# Patient Record
Sex: MALE | Race: WHITE | NOT HISPANIC OR LATINO | Employment: FULL TIME | URBAN - METROPOLITAN AREA
[De-identification: names, ages, dates, MRNs, and addresses within clinical notes are randomized per-mention and may not be internally consistent; named-entity substitution may affect disease eponyms.]

---

## 2017-02-01 ENCOUNTER — HOSPITAL ENCOUNTER (OUTPATIENT)
Dept: CARDIOLOGY | Facility: HOSPITAL | Age: 52
Discharge: HOME | End: 2017-02-01
Attending: INTERNAL MEDICINE | Admitting: INTERNAL MEDICINE

## 2017-02-01 VITALS
HEIGHT: 68 IN | DIASTOLIC BLOOD PRESSURE: 69 MMHG | WEIGHT: 150 LBS | BODY MASS INDEX: 22.73 KG/M2 | SYSTOLIC BLOOD PRESSURE: 122 MMHG

## 2017-02-01 DIAGNOSIS — I25.10 DISEASE OF CARDIOVASCULAR SYSTEM: ICD-10-CM

## 2017-02-01 PROCEDURE — 93306 TTE W/DOPPLER COMPLETE: CPT | Performed by: INTERNAL MEDICINE

## 2017-02-01 PROCEDURE — 93306 TTE W/DOPPLER COMPLETE: CPT

## 2017-02-02 LAB
BH CV ECHO MEAS - AO ROOT AREA (BSA CORRECTED): 1.8
BH CV ECHO MEAS - AO ROOT AREA: 8.6 CM^2
BH CV ECHO MEAS - AO ROOT DIAM: 3.3 CM
BH CV ECHO MEAS - BSA(HAYCOCK): 1.8 M^2
BH CV ECHO MEAS - BSA: 1.8 M^2
BH CV ECHO MEAS - BZI_BMI: 22.8 KILOGRAMS/M^2
BH CV ECHO MEAS - BZI_METRIC_HEIGHT: 172.7 CM
BH CV ECHO MEAS - BZI_METRIC_WEIGHT: 68 KG
BH CV ECHO MEAS - CONTRAST EF (2CH): 49 ML/M^2
BH CV ECHO MEAS - CONTRAST EF 4CH: 39.8 ML/M^2
BH CV ECHO MEAS - EDV(CUBED): 238.3 ML
BH CV ECHO MEAS - EDV(MOD-SP2): 100 ML
BH CV ECHO MEAS - EDV(MOD-SP4): 93 ML
BH CV ECHO MEAS - EDV(TEICH): 194 ML
BH CV ECHO MEAS - EF(CUBED): 53.6 %
BH CV ECHO MEAS - EF(MOD-SP2): 49 %
BH CV ECHO MEAS - EF(MOD-SP4): 39.8 %
BH CV ECHO MEAS - EF(TEICH): 44.6 %
BH CV ECHO MEAS - ESV(CUBED): 110.6 ML
BH CV ECHO MEAS - ESV(MOD-SP2): 51 ML
BH CV ECHO MEAS - ESV(MOD-SP4): 56 ML
BH CV ECHO MEAS - ESV(TEICH): 107.5 ML
BH CV ECHO MEAS - FS: 22.6 %
BH CV ECHO MEAS - IVS/LVPW: 0.75
BH CV ECHO MEAS - IVSD: 0.6 CM
BH CV ECHO MEAS - LA DIMENSION: 3.4 CM
BH CV ECHO MEAS - LA/AO: 1
BH CV ECHO MEAS - LAT PEAK E' VEL: 10 CM/SEC
BH CV ECHO MEAS - LV DIASTOLIC VOL/BSA (35-75): 51.4 ML/M^2
BH CV ECHO MEAS - LV MASS(C)D: 167.5 GRAMS
BH CV ECHO MEAS - LV MASS(C)DI: 92.6 GRAMS/M^2
BH CV ECHO MEAS - LV SYSTOLIC VOL/BSA (12-30): 31 ML/M^2
BH CV ECHO MEAS - LVIDD: 6.2 CM
BH CV ECHO MEAS - LVIDS: 4.8 CM
BH CV ECHO MEAS - LVLD AP2: 7.6 CM
BH CV ECHO MEAS - LVLD AP4: 6.6 CM
BH CV ECHO MEAS - LVLS AP2: 6.9 CM
BH CV ECHO MEAS - LVLS AP4: 6.1 CM
BH CV ECHO MEAS - LVPWD: 0.8 CM
BH CV ECHO MEAS - MED PEAK E' VEL: 8.08 CM/SEC
BH CV ECHO MEAS - MV A MAX VEL: 48.9 CM/SEC
BH CV ECHO MEAS - MV E MAX VEL: 72.1 CM/SEC
BH CV ECHO MEAS - MV E/A: 1.5
BH CV ECHO MEAS - PA ACC SLOPE: 325 CM/SEC^2
BH CV ECHO MEAS - PA ACC TIME: 0.16 SEC
BH CV ECHO MEAS - PA PR(ACCEL): 9.3 MMHG
BH CV ECHO MEAS - PI END-D VEL: 93.7 CM/SEC
BH CV ECHO MEAS - RAP SYSTOLE: 3 MMHG
BH CV ECHO MEAS - RVDD: 2.6 CM
BH CV ECHO MEAS - RVSP: 32.2 MMHG
BH CV ECHO MEAS - SI(CUBED): 70.6 ML/M^2
BH CV ECHO MEAS - SI(MOD-SP2): 27.1 ML/M^2
BH CV ECHO MEAS - SI(MOD-SP4): 20.5 ML/M^2
BH CV ECHO MEAS - SI(TEICH): 47.8 ML/M^2
BH CV ECHO MEAS - SV(CUBED): 127.7 ML
BH CV ECHO MEAS - SV(MOD-SP2): 49 ML
BH CV ECHO MEAS - SV(MOD-SP4): 37 ML
BH CV ECHO MEAS - SV(TEICH): 86.5 ML
BH CV ECHO MEAS - TAPSE (>1.6): 2.8 CM2
BH CV ECHO MEAS - TR MAX VEL: 270 CM/SEC
BH CV XLRA - RV BASE: 4.6 CM
BH CV XLRA - RV LENGTH: 8.1 CM
BH CV XLRA - RV MID: 3.8 CM
BH CV XLRA - TDI S': 14.1 CM/SEC
LEFT ATRIUM VOLUME INDEX: 23.8 ML/M2
LEFT ATRIUM VOLUME: 43 CM3
LV EF 2D ECHO EST: 40 %

## 2017-02-21 ENCOUNTER — OFFICE VISIT (OUTPATIENT)
Dept: CARDIOLOGY | Facility: CLINIC | Age: 52
End: 2017-02-21

## 2017-02-21 VITALS
WEIGHT: 153.8 LBS | BODY MASS INDEX: 23.31 KG/M2 | HEIGHT: 68 IN | DIASTOLIC BLOOD PRESSURE: 62 MMHG | HEART RATE: 83 BPM | SYSTOLIC BLOOD PRESSURE: 100 MMHG

## 2017-02-21 DIAGNOSIS — I25.10 CORONARY ARTERY DISEASE INVOLVING NATIVE CORONARY ARTERY OF NATIVE HEART WITHOUT ANGINA PECTORIS: Primary | ICD-10-CM

## 2017-02-21 DIAGNOSIS — I25.5 ISCHEMIC CARDIOMYOPATHY: ICD-10-CM

## 2017-02-21 DIAGNOSIS — Z72.0 TOBACCO ABUSE: ICD-10-CM

## 2017-02-21 DIAGNOSIS — I49.3 PVC'S (PREMATURE VENTRICULAR CONTRACTIONS): ICD-10-CM

## 2017-02-21 DIAGNOSIS — E78.5 DYSLIPIDEMIA: ICD-10-CM

## 2017-02-21 PROCEDURE — 93000 ELECTROCARDIOGRAM COMPLETE: CPT | Performed by: NURSE PRACTITIONER

## 2017-02-21 PROCEDURE — 99213 OFFICE O/P EST LOW 20 MIN: CPT | Performed by: NURSE PRACTITIONER

## 2017-02-21 RX ORDER — ATORVASTATIN CALCIUM 40 MG/1
TABLET, FILM COATED ORAL DAILY
Refills: 2 | COMMUNITY
Start: 2017-02-18 | End: 2017-03-08 | Stop reason: DRUGHIGH

## 2017-02-21 RX ORDER — TICAGRELOR 90 MG/1
TABLET ORAL 2 TIMES DAILY
Refills: 2 | COMMUNITY
Start: 2017-02-18 | End: 2017-02-21 | Stop reason: DRUGHIGH

## 2017-02-21 RX ORDER — NITROGLYCERIN 0.4 MG/1
0.4 TABLET SUBLINGUAL AS NEEDED
COMMUNITY

## 2017-02-21 NOTE — PROGRESS NOTES
Subjective:     Encounter Date:02/21/2017      Patient ID: Westley Thomas is a 51 y.o. male.    Chief Complaint:Follow-up   for coronary artery disease, and ischemic cardio myopathy.    PROBLEM LIST:  1. Coronary artery disease.   a. Non-ST myocardial infarction, 03/23/2016. Cardiac catheterization: 95% mid-LAD (3.5 x 32 and 3.25 x 32 XIENCE). 100% diagonal. 50% right coronary artery.   b. Echocardiogram: Left ventricular ejection fraction 40% to 45%.   2. Ischemic cardiomyopathy  a. 2016 EF 40-45%  b. 2017 EF 40%  3. PVC/PAC's  4. Tobacco abuse.   a. Resolved, 02/2016.   5. History of encephalitis in 1993.   6. Tonsillectomy.       No Known Allergies      Current Outpatient Prescriptions:   •  aspirin 81 MG tablet, Take 81 mg by mouth Daily., Disp: , Rfl:   •  atorvastatin (LIPITOR) 40 MG tablet, Daily., Disp: , Rfl: 2  •  BRILINTA 90 MG tablet tablet, 2 (Two) Times a Day., Disp: , Rfl: 2  •  nitroglycerin (NITROSTAT) 0.4 MG SL tablet, Place 0.4 mg under the tongue As Needed for chest pain. Take no more than 3 doses in 15 minutes., Disp: , Rfl:         History of Present Illness    Patient is a 48-year-old  male who is seen today for follow-up of coronary disease and ischemic cardiomyopathy.  Since last being seen he notes overall doing well.  Has some occasional chest pain but this is infrequent and not similar to all to his previous angina.  Denies any increasing shortness of breath.  Notes that he is very active with his job and does lots of walking without any cardiovascular limiting symptoms.  Denies any syncope, near-syncope, edema.  He has an occasional cigarette but overall has stopped smoking.  Patient denies any palpitations but notes that during his echocardiogram he noted frequent irregular heartbeats on the monitor.    The following portions of the patient's history were reviewed and updated as appropriate: allergies, current medications, past family history, past medical history, past social  "history, past surgical history and problem list.    Family History   Problem Relation Age of Onset   • Cancer Father    • No Known Problems Mother        Social History   Substance Use Topics   • Smoking status: Former Smoker     Quit date: 08/2016   • Smokeless tobacco: Never Used   • Alcohol use No         Review of Systems   Constitution: Negative for fever, weakness and malaise/fatigue.   HENT: Negative for headaches and nosebleeds.    Eyes: Negative for redness and visual disturbance.   Cardiovascular: Negative for orthopnea, palpitations and paroxysmal nocturnal dyspnea.   Respiratory: Negative for cough, snoring, sputum production and wheezing.    Hematologic/Lymphatic: Negative for bleeding problem.   Skin: Negative for flushing, itching and rash.   Musculoskeletal: Negative for falls, joint pain and muscle cramps.   Gastrointestinal: Negative for abdominal pain, diarrhea, heartburn, nausea and vomiting.   Genitourinary: Negative for hematuria.   Neurological: Negative for excessive daytime sleepiness, dizziness and tremors.   Psychiatric/Behavioral: Negative for substance abuse. The patient is not nervous/anxious.           Objective:    height is 68\" (172.7 cm) and weight is 153 lb 12.8 oz (69.8 kg). His blood pressure is 100/62 and his pulse is 83.         Physical Exam   Constitutional: He is oriented to person, place, and time. He appears well-developed and well-nourished. No distress.   HENT:   Mouth/Throat: Oropharynx is clear and moist.   Neck: No JVD present. Carotid bruit is not present.   Cardiovascular: Normal rate, S1 normal, S2 normal, normal heart sounds and intact distal pulses.  An irregular rhythm present.   Pulmonary/Chest: Breath sounds normal.   Abdominal: Soft. Bowel sounds are normal. There is no tenderness.   Musculoskeletal: He exhibits no edema.   Neurological: He is alert and oriented to person, place, and time.   Skin: Skin is warm and dry.         ECG 12 Lead  Date/Time: " 2/21/2017 3:45 PM  Performed by: SAVANNAH NGUYEN  Authorized by: SAVANNAH NGUYEN   Comparison: compared with previous ECG from 3/3/2016  Rhythm: sinus rhythm  Ectopy: atrial premature contractions  Clinical impression: abnormal ECG                  Assessment:   Assessment/Plan      Westley was seen today for follow-up.    Diagnoses and all orders for this visit:    Coronary artery disease involving native coronary artery of native heart without angina pectoris  -     Comprehensive Metabolic Panel; Future    Ischemic cardiomyopathy, stable  -     Comprehensive Metabolic Panel; Future    Dyslipidemia, on statin therapy  -     Lipid Panel; Future    PVC's (premature ventricular contractions), asymptomatic but frequent  -     Comprehensive Metabolic Panel; Future  -     TSH; Future  -     CBC (No Diff); Future  -     Magnesium; Future    Tobacco abuse      Plan:    At this time we'll make no changes to the patient's medication regimen.  Overall he is stable from a cardiac standpoint.  Discussed his current medication regimen with him and need for continuation of his medications.  We'll continue him on Brilinta therapy at their maintenance dose.  Given his noted frequent PACs/PVCs we'll check labs at this is not been done in the year.  We'll check CMP, CBC, lipid profile, TSH, and magnesium levels.  Encouraged complete smoking cessation.  We'll see him back in one year's time or sooner if needed.    MARISOL Robbins         Please note that portions of this note may have been completed with a voice recognition program. Efforts were made to edit the dictations, but occasionally words are mistranscribed.

## 2017-03-08 RX ORDER — ATORVASTATIN CALCIUM 80 MG/1
80 TABLET, FILM COATED ORAL DAILY
Qty: 90 TABLET | Refills: 1 | Status: SHIPPED | OUTPATIENT
Start: 2017-03-08 | End: 2018-02-28 | Stop reason: DRUGHIGH

## 2017-03-09 DIAGNOSIS — E78.5 HYPERLIPIDEMIA LDL GOAL <70: Primary | ICD-10-CM

## 2017-03-21 RX ORDER — TICAGRELOR 90 MG/1
TABLET ORAL
Qty: 180 TABLET | Refills: 3 | Status: SHIPPED | OUTPATIENT
Start: 2017-03-21 | End: 2018-02-28

## 2017-03-21 RX ORDER — ATORVASTATIN CALCIUM 40 MG/1
TABLET, FILM COATED ORAL
Qty: 90 TABLET | Refills: 3 | Status: SHIPPED | OUTPATIENT
Start: 2017-03-21

## 2018-02-28 ENCOUNTER — OFFICE VISIT (OUTPATIENT)
Dept: CARDIOLOGY | Facility: CLINIC | Age: 53
End: 2018-02-28

## 2018-02-28 VITALS
DIASTOLIC BLOOD PRESSURE: 82 MMHG | SYSTOLIC BLOOD PRESSURE: 120 MMHG | WEIGHT: 144 LBS | BODY MASS INDEX: 21.82 KG/M2 | HEIGHT: 68 IN | HEART RATE: 56 BPM

## 2018-02-28 DIAGNOSIS — I25.10 CORONARY ARTERY DISEASE INVOLVING NATIVE CORONARY ARTERY OF NATIVE HEART WITHOUT ANGINA PECTORIS: Primary | ICD-10-CM

## 2018-02-28 DIAGNOSIS — E78.5 DYSLIPIDEMIA: ICD-10-CM

## 2018-02-28 DIAGNOSIS — I25.5 ISCHEMIC CARDIOMYOPATHY: ICD-10-CM

## 2018-02-28 PROCEDURE — 99213 OFFICE O/P EST LOW 20 MIN: CPT | Performed by: INTERNAL MEDICINE

## 2018-02-28 NOTE — PROGRESS NOTES
Subjective:     Encounter Date:02/28/2018    Primary Care Physician: Vinnie Herrera MD      Patient ID: Westley Thmoas is a 52 y.o. male.    Chief Complaint:Coronary Artery Disease and Cardiomyopathy    PROBLEM LIST:  1. Coronary artery disease.   a. Non-ST myocardial infarction, 03/23/2016. Cardiac catheterization: 95% mid-LAD (3.5 x 32 and 3.25 x 32 XIENCE). 100% diagonal. 50% right coronary artery.   b. Echocardiogram: Left ventricular ejection fraction 40% to 45%.   2. Ischemic cardiomyopathy  a. 2016 EF 40-45%  b. 2017 EF 40%  3. PVC/PAC's  4. Tobacco abuse.   a. Resolved, 02/2016.   5. History of encephalitis in 1993.   6. Tonsillectomy.       No Known Allergies      Current Outpatient Prescriptions:   •  aspirin 81 MG tablet, Take 81 mg by mouth Daily., Disp: , Rfl:   •  atorvastatin (LIPITOR) 40 MG tablet, TAKE 1 TABLET AT BEDTIME, Disp: 90 tablet, Rfl: 3  •  nitroglycerin (NITROSTAT) 0.4 MG SL tablet, Place 0.4 mg under the tongue As Needed for chest pain. Take no more than 3 doses in 15 minutes., Disp: , Rfl:         History of Present Illness    Patient returns in follow-up was coronary disease.  Since her last visit is doing very well.  He has lost 10 pounds inadvertently.  He did however stopped smoking 3 months ago :-).  Denies any chest pain.  Is very active.  Stopped his Brilinta when he quit smoking.  Denies orthopnea PND or edema    The following portions of the patient's history were reviewed and updated as appropriate: allergies, current medications, past family history, past medical history, past social history, past surgical history and problem list.      Social History   Substance Use Topics   • Smoking status: Former Smoker     Quit date: 08/2016   • Smokeless tobacco: Never Used   • Alcohol use No         Review of Systems   Constitution: Negative.   Cardiovascular: Negative.    Respiratory: Negative.    Hematologic/Lymphatic: Negative for bleeding problem. Does not bruise/bleed easily.  "  Skin: Negative for rash.   Musculoskeletal: Negative for muscle weakness and myalgias.   Gastrointestinal: Negative for heartburn, nausea and vomiting.   Neurological: Negative.           Objective:    height is 172.7 cm (68\") and weight is 65.3 kg (144 lb). His blood pressure is 120/82 and his pulse is 56.         Physical Exam   Constitutional: He is oriented to person, place, and time. He appears well-developed and well-nourished.   HENT:   Mouth/Throat: Oropharynx is clear and moist.   Neck: No JVD present. Carotid bruit is not present. No thyromegaly present.   Cardiovascular: Regular rhythm, S1 normal, S2 normal, normal heart sounds and intact distal pulses.  Exam reveals no gallop, no S3 and no S4.    No murmur heard.  Pulses:       Carotid pulses are 2+ on the right side, and 2+ on the left side.       Radial pulses are 2+ on the right side, and 2+ on the left side.   Pulmonary/Chest: Breath sounds normal.   Abdominal: Soft. Bowel sounds are normal. He exhibits no mass. There is no tenderness.   Musculoskeletal: He exhibits no edema.   Neurological: He is alert and oriented to person, place, and time.   Skin: Skin is warm and dry. No rash noted.       Procedures          Assessment:   Assessment/Plan      Westley was seen today for coronary artery disease and cardiomyopathy.    Diagnoses and all orders for this visit:    Coronary artery disease involving native coronary artery of native heart without angina pectoris    Ischemic cardiomyopathy    Dyslipidemia      1.  Coronary artery disease, 2 years status post complex PCI.  No angina.  2.  Ischemic cardio myopathy last LVEF 40%.  Asymptomatic.  [place] tobacco abuse resolved  4.  Frequent PVCs, asymptomatic.  Won't tolerate beta blocker due to resting bradycardia     We'll check an echocardiogram to evaluate LVEF.  Continue to encourage smoking cessation.  :-).    Antoine Valdez MD        Dictated utilizing Dragon dictation  "